# Patient Record
Sex: FEMALE | Race: WHITE | NOT HISPANIC OR LATINO
[De-identification: names, ages, dates, MRNs, and addresses within clinical notes are randomized per-mention and may not be internally consistent; named-entity substitution may affect disease eponyms.]

---

## 2017-02-26 ENCOUNTER — TRANSCRIPTION ENCOUNTER (OUTPATIENT)
Age: 27
End: 2017-02-26

## 2017-03-26 ENCOUNTER — TRANSCRIPTION ENCOUNTER (OUTPATIENT)
Age: 27
End: 2017-03-26

## 2018-03-25 ENCOUNTER — EMERGENCY (EMERGENCY)
Facility: HOSPITAL | Age: 28
LOS: 1 days | Discharge: HOME | End: 2018-03-25
Attending: EMERGENCY MEDICINE | Admitting: EMERGENCY MEDICINE
Payer: MEDICAID

## 2018-03-25 VITALS
SYSTOLIC BLOOD PRESSURE: 130 MMHG | WEIGHT: 220.02 LBS | OXYGEN SATURATION: 97 % | TEMPERATURE: 98 F | DIASTOLIC BLOOD PRESSURE: 87 MMHG | RESPIRATION RATE: 17 BRPM | HEART RATE: 96 BPM

## 2018-03-25 DIAGNOSIS — Z90.89 ACQUIRED ABSENCE OF OTHER ORGANS: Chronic | ICD-10-CM

## 2018-03-25 LAB
ALBUMIN SERPL ELPH-MCNC: 4.2 G/DL — SIGNIFICANT CHANGE UP (ref 3.3–5)
ALP SERPL-CCNC: 75 U/L — SIGNIFICANT CHANGE UP (ref 40–120)
ALT FLD-CCNC: 16 U/L RC — SIGNIFICANT CHANGE UP (ref 10–45)
ANION GAP SERPL CALC-SCNC: 11 MMOL/L — SIGNIFICANT CHANGE UP (ref 5–17)
APPEARANCE UR: CLEAR — SIGNIFICANT CHANGE UP
AST SERPL-CCNC: 14 U/L — SIGNIFICANT CHANGE UP (ref 10–40)
BACTERIA # UR AUTO: ABNORMAL /HPF
BASOPHILS # BLD AUTO: 0.1 K/UL — SIGNIFICANT CHANGE UP (ref 0–0.2)
BASOPHILS NFR BLD AUTO: 1.2 % — SIGNIFICANT CHANGE UP (ref 0–2)
BILIRUB SERPL-MCNC: 0.1 MG/DL — LOW (ref 0.2–1.2)
BILIRUB UR-MCNC: NEGATIVE — SIGNIFICANT CHANGE UP
BUN SERPL-MCNC: 6 MG/DL — LOW (ref 7–23)
CALCIUM SERPL-MCNC: 10.2 MG/DL — SIGNIFICANT CHANGE UP (ref 8.4–10.5)
CHLORIDE SERPL-SCNC: 101 MMOL/L — SIGNIFICANT CHANGE UP (ref 96–108)
CO2 SERPL-SCNC: 26 MMOL/L — SIGNIFICANT CHANGE UP (ref 22–31)
COLOR SPEC: COLORLESS — SIGNIFICANT CHANGE UP
CREAT SERPL-MCNC: 0.66 MG/DL — SIGNIFICANT CHANGE UP (ref 0.5–1.3)
DIFF PNL FLD: NEGATIVE — SIGNIFICANT CHANGE UP
EOSINOPHIL # BLD AUTO: 0.2 K/UL — SIGNIFICANT CHANGE UP (ref 0–0.5)
EOSINOPHIL NFR BLD AUTO: 2.2 % — SIGNIFICANT CHANGE UP (ref 0–6)
EPI CELLS # UR: SIGNIFICANT CHANGE UP /HPF
GLUCOSE SERPL-MCNC: 86 MG/DL — SIGNIFICANT CHANGE UP (ref 70–99)
GLUCOSE UR QL: NEGATIVE — SIGNIFICANT CHANGE UP
HCG SERPL QL: NEGATIVE — SIGNIFICANT CHANGE UP
HCT VFR BLD CALC: 39.4 % — SIGNIFICANT CHANGE UP (ref 34.5–45)
HGB BLD-MCNC: 13.6 G/DL — SIGNIFICANT CHANGE UP (ref 11.5–15.5)
KETONES UR-MCNC: NEGATIVE — SIGNIFICANT CHANGE UP
LEUKOCYTE ESTERASE UR-ACNC: NEGATIVE — SIGNIFICANT CHANGE UP
LYMPHOCYTES # BLD AUTO: 4.6 K/UL — HIGH (ref 1–3.3)
LYMPHOCYTES # BLD AUTO: 43.9 % — SIGNIFICANT CHANGE UP (ref 13–44)
MCHC RBC-ENTMCNC: 31.3 PG — SIGNIFICANT CHANGE UP (ref 27–34)
MCHC RBC-ENTMCNC: 34.6 GM/DL — SIGNIFICANT CHANGE UP (ref 32–36)
MCV RBC AUTO: 90.7 FL — SIGNIFICANT CHANGE UP (ref 80–100)
MONOCYTES # BLD AUTO: 0.6 K/UL — SIGNIFICANT CHANGE UP (ref 0–0.9)
MONOCYTES NFR BLD AUTO: 5.3 % — SIGNIFICANT CHANGE UP (ref 2–14)
NEUTROPHILS # BLD AUTO: 4.9 K/UL — SIGNIFICANT CHANGE UP (ref 1.8–7.4)
NEUTROPHILS NFR BLD AUTO: 47.5 % — SIGNIFICANT CHANGE UP (ref 43–77)
NITRITE UR-MCNC: NEGATIVE — SIGNIFICANT CHANGE UP
PH UR: 6 — SIGNIFICANT CHANGE UP (ref 5–8)
PLATELET # BLD AUTO: 318 K/UL — SIGNIFICANT CHANGE UP (ref 150–400)
POTASSIUM SERPL-MCNC: 4.4 MMOL/L — SIGNIFICANT CHANGE UP (ref 3.5–5.3)
POTASSIUM SERPL-SCNC: 4.4 MMOL/L — SIGNIFICANT CHANGE UP (ref 3.5–5.3)
PROT SERPL-MCNC: 7.4 G/DL — SIGNIFICANT CHANGE UP (ref 6–8.3)
PROT UR-MCNC: NEGATIVE — SIGNIFICANT CHANGE UP
RBC # BLD: 4.35 M/UL — SIGNIFICANT CHANGE UP (ref 3.8–5.2)
RBC # FLD: 11.4 % — SIGNIFICANT CHANGE UP (ref 10.3–14.5)
RBC CASTS # UR COMP ASSIST: SIGNIFICANT CHANGE UP /HPF (ref 0–2)
SODIUM SERPL-SCNC: 138 MMOL/L — SIGNIFICANT CHANGE UP (ref 135–145)
SP GR SPEC: 1 — LOW (ref 1.01–1.02)
UROBILINOGEN FLD QL: NEGATIVE — SIGNIFICANT CHANGE UP
WBC # BLD: 10.4 K/UL — SIGNIFICANT CHANGE UP (ref 3.8–10.5)
WBC # FLD AUTO: 10.4 K/UL — SIGNIFICANT CHANGE UP (ref 3.8–10.5)
WBC UR QL: SIGNIFICANT CHANGE UP /HPF (ref 0–5)

## 2018-03-25 PROCEDURE — 76830 TRANSVAGINAL US NON-OB: CPT | Mod: 26

## 2018-03-25 PROCEDURE — 76775 US EXAM ABDO BACK WALL LIM: CPT | Mod: 26

## 2018-03-25 PROCEDURE — 99284 EMERGENCY DEPT VISIT MOD MDM: CPT | Mod: 25

## 2018-03-25 PROCEDURE — 93975 VASCULAR STUDY: CPT | Mod: 26

## 2018-03-25 RX ORDER — SODIUM CHLORIDE 9 MG/ML
1000 INJECTION INTRAMUSCULAR; INTRAVENOUS; SUBCUTANEOUS ONCE
Qty: 0 | Refills: 0 | Status: COMPLETED | OUTPATIENT
Start: 2018-03-25 | End: 2018-03-25

## 2018-03-25 RX ORDER — KETOROLAC TROMETHAMINE 30 MG/ML
15 SYRINGE (ML) INJECTION ONCE
Qty: 0 | Refills: 0 | Status: DISCONTINUED | OUTPATIENT
Start: 2018-03-25 | End: 2018-03-25

## 2018-03-25 RX ADMIN — Medication 15 MILLIGRAM(S): at 22:30

## 2018-03-25 RX ADMIN — Medication 15 MILLIGRAM(S): at 23:00

## 2018-03-25 RX ADMIN — SODIUM CHLORIDE 1000 MILLILITER(S): 9 INJECTION INTRAMUSCULAR; INTRAVENOUS; SUBCUTANEOUS at 22:30

## 2018-03-25 NOTE — ED PROVIDER NOTE - CARE PLAN
Principal Discharge DX:	Abdominal pain  Assessment and plan of treatment:	Follow up with your primary care doctor within 48-72 hours.   You must return for new, worsening or concerning symptoms; specifically including those listed on the attached sheet.  You may take 1000mg of Tylenol every 6 hours for baseline pain control with respect to the warnings on the label.   You may try ducolax or magnesium citrate or miralax as indicated on the label with respect to the warnings for constipation symptoms. Principal Discharge DX:	Abdominal pain  Goal:	ATTENDING ADDENDUM (Dr. Manny Moses): Goals of care include resolution of emergent/urgent symptoms and concerns, and restoration to baseline level of homeostasis.  Assessment and plan of treatment:	Follow up with your primary care doctor within 48-72 hours.   You must return for new, worsening or concerning symptoms; specifically including those listed on the attached sheet.  You may take 1000mg of Tylenol every 6 hours for baseline pain control with respect to the warnings on the label.   You may try ducolax or magnesium citrate or miralax as indicated on the label with respect to the warnings for constipation symptoms.

## 2018-03-25 NOTE — ED PROVIDER NOTE - PLAN OF CARE
Follow up with your primary care doctor within 48-72 hours.   You must return for new, worsening or concerning symptoms; specifically including those listed on the attached sheet.  You may take 1000mg of Tylenol every 6 hours for baseline pain control with respect to the warnings on the label.   You may try ducolax or magnesium citrate or miralax as indicated on the label with respect to the warnings for constipation symptoms. ATTENDING ADDENDUM (Dr. Manny Moses): Goals of care include resolution of emergent/urgent symptoms and concerns, and restoration to baseline level of homeostasis.

## 2018-03-25 NOTE — ED PROVIDER NOTE - OBJECTIVE STATEMENT
28y with abdominal pain in lower back, flank, radiating to vagina on left, increased frequency with small output. Denies fever/chills. history of bladder infection but has never had this flank pain, no history of stones. LMP was 1 week ago and was heavy and painful. no diarrhea, no cough, denies hematuria, denies dysuria. no vomiting. Pain is moderate to severe and throbs constantly. denies sharp stabbing flank pain.     PMD: Praveen Reeves

## 2018-03-25 NOTE — ED PROVIDER NOTE - CONDUCTED A DETAILED DISCUSSION WITH PATIENT AND/OR GUARDIAN REGARDING, MDM
return to ED if symptoms worsen, persist or questions arise/**ATTENDING ADDENDUM (Dr. Manny Moses): Anticipatory guidance provided by ED team./lab results/need for outpatient follow-up/radiology results

## 2018-03-25 NOTE — ED ADULT NURSE REASSESSMENT NOTE - NS ED NURSE REASSESS COMMENT FT1
Patient returned from US, reporting 7/10 LLQ abd pain. Medicated as per MDs orders. Awaiting US results

## 2018-03-25 NOTE — ED ADULT NURSE NOTE - OBJECTIVE STATEMENT
28 year old female patient presents to ED c/o L sided flank pain radiating to L groin. Patient reports taking motrin with some relief of pain. Denies CP, SOB, n/v/d, fever, chills, hematuria or dysuria. LMP last week, reports heavier flow than usual. Pt well appearing and not in distress. Family at bedside.

## 2018-03-25 NOTE — ED ADULT NURSE NOTE - CHPI ED SYMPTOMS NEG
no dysuria/no diarrhea/no abdominal distension/no nausea/no fever/no hematuria/no vomiting/no burning urination/no chills

## 2018-03-25 NOTE — ED ADULT TRIAGE NOTE - CHIEF COMPLAINT QUOTE
pt c/o pain to left flank that radiates around to her LLQ and vagina.  pt states that her last menstrual cycle was abnormal with excessive bleeding.

## 2018-03-25 NOTE — ED PROVIDER NOTE - PROGRESS NOTE DETAILS
Pedrito PGY3: tolerated PO, normal ct with moderate stool burden. will be discharged with follow up. Pedrito PGY3: gc chlamydia test sent. atypical presentation and will not treat at this time. will treat if positive through call back system. Pedrito PGY3: tolerated PO, normal ct with moderate stool burden. will be discharged with follow up.  **ATTENDING ADDENDUM (Dr. Manny Moses): agree with above notation by EM resident Pedrito. Patient serially evaluated throughout ED course by ED team. All ED diagnostics reviewed and noted. Agree with discharge home with close outpatient followup with primary care physician/provider and with medications (if appropriate, if clinically indicated, and as prescribed, as noted in EMR).

## 2018-03-25 NOTE — ED PROVIDER NOTE - PHYSICAL EXAMINATION
Pedrito: A & O x 3, NAD, HEENT WNL and no facial asymmetry; lungs CTAB, heart with reg rhythm; abdomen soft with left lower quadrant pain, left cva tenderness, extremities with no edema; skin with no rashes, neuro exam non focal with no motor or sensory deficits [facility]: A & O x 3, NAD, HEENT WNL and no facial asymmetry; lungs CTAB, heart with reg rhythm; abdomen soft with left lower quadrant pain, left cva tenderness, extremities with no edema; skin with no rashes, neuro exam non focal with no motor or sensory deficits  Attending Dominguez: Gen: NAD, heent: atrauamtic, eomi, perrla, mmm, op pink, uvula midline, neck; nttp, no nuchal rigidity, chest: nttp, no crepitus, cv: rrr, no murmurs, lungs: ctab, abd: soft, nontender, ttp llq, no peritoneal signs, +BS, no guarding, ext: wwp, neg homans, skin: no rash, neuro: awake and alert, following commands, speech clear, sensation and strength intact, no focal deficits gu: no adnexal ttp, no erythema, no cmt

## 2018-03-25 NOTE — ED PROVIDER NOTE - SHIFT CHANGE DETAILS
***ATTENDING ADDENDUM (Dr. Manny Moses): I have received handoff from ROCÍO Dominguez. Awaiting completion of all ED diagnostics. Will continue to observe and monitor closely.

## 2018-03-25 NOTE — ED PROVIDER NOTE - MEDICAL DECISION MAKING DETAILS
Pedrito PGY3: flank and abdominal pain on left concerning for uti/pyelo. will perform basic labs and urinalysis urine preg. if ua pos we will treat pyelo. if negative will need sequential TV us for torsion cyst and ultimately ct for diverticulitis as tenderness is concerning. Pedrito PGY3: flank and abdominal pain on left concerning for uti/pyelo. will perform basic labs and urinalysis urine preg. if ua pos we will treat pyelo. if negative will need sequential TV us for torsion cyst and ultimately ct for diverticulitis as tenderness is concerning.  Attending Alberto: 29 y/o female presenting LLQ pain. no cmt or vaginal discharge to suggest PID. TVUS shows no evidence of ovarian cyst to suggest torsion. on re-eval pt with persistent pain. will check CT to further evaluate. will re-eval

## 2018-03-26 VITALS
RESPIRATION RATE: 16 BRPM | DIASTOLIC BLOOD PRESSURE: 87 MMHG | SYSTOLIC BLOOD PRESSURE: 126 MMHG | TEMPERATURE: 98 F | HEART RATE: 88 BPM | OXYGEN SATURATION: 97 %

## 2018-03-26 LAB
C TRACH RRNA SPEC QL NAA+PROBE: SIGNIFICANT CHANGE UP
CULTURE RESULTS: NO GROWTH — SIGNIFICANT CHANGE UP
N GONORRHOEA RRNA SPEC QL NAA+PROBE: SIGNIFICANT CHANGE UP
SPECIMEN SOURCE: SIGNIFICANT CHANGE UP
SPECIMEN SOURCE: SIGNIFICANT CHANGE UP

## 2018-03-26 PROCEDURE — 83690 ASSAY OF LIPASE: CPT

## 2018-03-26 PROCEDURE — 76775 US EXAM ABDO BACK WALL LIM: CPT

## 2018-03-26 PROCEDURE — 93975 VASCULAR STUDY: CPT

## 2018-03-26 PROCEDURE — 84703 CHORIONIC GONADOTROPIN ASSAY: CPT

## 2018-03-26 PROCEDURE — 74177 CT ABD & PELVIS W/CONTRAST: CPT | Mod: 26

## 2018-03-26 PROCEDURE — 76830 TRANSVAGINAL US NON-OB: CPT

## 2018-03-26 PROCEDURE — 87086 URINE CULTURE/COLONY COUNT: CPT

## 2018-03-26 PROCEDURE — 81001 URINALYSIS AUTO W/SCOPE: CPT

## 2018-03-26 PROCEDURE — 74177 CT ABD & PELVIS W/CONTRAST: CPT

## 2018-03-26 PROCEDURE — 80053 COMPREHEN METABOLIC PANEL: CPT

## 2018-03-26 PROCEDURE — 85027 COMPLETE CBC AUTOMATED: CPT

## 2018-03-26 PROCEDURE — 96374 THER/PROPH/DIAG INJ IV PUSH: CPT | Mod: XU

## 2018-03-26 PROCEDURE — 99284 EMERGENCY DEPT VISIT MOD MDM: CPT | Mod: 25

## 2019-02-27 ENCOUNTER — TRANSCRIPTION ENCOUNTER (OUTPATIENT)
Age: 29
End: 2019-02-27

## 2024-04-25 NOTE — ED ADULT NURSE NOTE - NS ED NOTE  TALK SOMEONE YN
Physical Therapy Visit    Visit Type: Daily Treatment Note  Visit: 2  Referring Provider: Gunnar Samaniego MD  Medical Diagnosis (from order): R53.1 - Weakness  R26.89 - Impairment of balance       OBJECTIVE                                                                                                                              Ambulation / Gait  - Assistive device: 2-wheeled walker  - Distance (feet unless otherwise indicated): 35  - Assist Level: contact guard/touching/steadying assist  - Description: decreased step length left, decreased step length right, decreased dereje/pace and steppage  Close w/c follow for safety           Treatment     Therapeutic Exercise  Reviewed previously issued HEP using visual instructions, needs some cues for technique:  -knee extension  -hip flexion  -hip add with pillow squeeze  -hip ABD   Added seated toe raise with belt for UE assist -> HEP      Therapeutic Activity  Parallel bars-  Static standing x 1 minute, focus on hip/knee extension for stability  Amb 10 feet x 2 reps simi UE's support and contact guard assist   Amb 35 feet with rolling walker, contact guard assist with decreased knee control noted with fatigue  Standing at kitchen sink, 1 min x 2 reps with UE support and contact guard assist->HEP            Therapy procedure time and total treatment time can be found documented on the Time Entry flowsheet     No